# Patient Record
Sex: MALE | Race: WHITE | NOT HISPANIC OR LATINO | Employment: OTHER | URBAN - NONMETROPOLITAN AREA
[De-identification: names, ages, dates, MRNs, and addresses within clinical notes are randomized per-mention and may not be internally consistent; named-entity substitution may affect disease eponyms.]

---

## 2017-02-14 ENCOUNTER — OFFICE VISIT (OUTPATIENT)
Dept: CARDIOLOGY | Facility: CLINIC | Age: 52
End: 2017-02-14

## 2017-02-14 VITALS
SYSTOLIC BLOOD PRESSURE: 144 MMHG | WEIGHT: 210.6 LBS | HEART RATE: 108 BPM | HEIGHT: 68 IN | DIASTOLIC BLOOD PRESSURE: 80 MMHG | OXYGEN SATURATION: 98 % | BODY MASS INDEX: 31.92 KG/M2

## 2017-02-14 DIAGNOSIS — E78.2 MIXED HYPERLIPIDEMIA: ICD-10-CM

## 2017-02-14 DIAGNOSIS — I25.10 CORONARY ARTERY DISEASE INVOLVING NATIVE CORONARY ARTERY OF NATIVE HEART WITHOUT ANGINA PECTORIS: Primary | ICD-10-CM

## 2017-02-14 DIAGNOSIS — I10 ESSENTIAL HYPERTENSION: ICD-10-CM

## 2017-02-14 PROCEDURE — 99213 OFFICE O/P EST LOW 20 MIN: CPT | Performed by: INTERNAL MEDICINE

## 2017-02-14 PROCEDURE — 93000 ELECTROCARDIOGRAM COMPLETE: CPT | Performed by: INTERNAL MEDICINE

## 2017-02-14 RX ORDER — METOPROLOL TARTRATE 75 MG/1
1 TABLET, FILM COATED ORAL 2 TIMES DAILY
Qty: 180 TABLET | Refills: 0 | Status: SHIPPED | OUTPATIENT
Start: 2017-02-14 | End: 2017-05-10 | Stop reason: SDUPTHER

## 2017-02-14 RX ORDER — SIMVASTATIN 10 MG
20 TABLET ORAL NIGHTLY
COMMUNITY
End: 2017-05-02 | Stop reason: CLARIF

## 2017-02-14 NOTE — PROGRESS NOTES
subjective     Chief Complaint   Patient presents with   • Chest Pain   • Coronary Artery Disease   • Hypertension     History of Present Illness  Patient is a 51 years old white male who has known coronary artery disease status post CABG followed by multiple stents, angioplasty and stenting to the vein graft to RCA by Dr. Landers in 2014.    Patient was recently admitted to the hospital on October 24, 2016 and discharged on October 26, 2016.  He had a stress test done by Dr. Munson was found to have small sized mild degree of ischemia and inferior wall.  Patient states that he was taken to the cardiac catheter lab for possible coronary angiography however it was decided against it and patient was sent home with the addition of Imdur 30 mg daily and Ranexa 500 twice a day.  Patient is not having any chest pain anymore is feeling better.  But his heartbeat is fast.    HYPERTENSION  Markos Salinas has long-standing essential hypertension for years. he is taking medications regularly. There are no medication side effects. Blood pressure is very well controlled. There has been no headache nausea chest pain. There has been no syncopal or presyncopal episode. he denies episodes of hypo-tension or accelerated hypertension.    Hyperlipidemia  Markos Salinas has long-standing history of hyperlipidemia. Has been trying to lose weight following diet and exercise. Patient is tolerating medications very well. There has been no side effects. Latest lipid levels have been good. Patient has not had any lab work in my office but states that his lipids have been very well controlled and he has been checking with his PCP.     Patient also is diabetic type 2 insulin-dependent.    Patient Active Problem List   Diagnosis   • Vitamin B12 deficiency   • Vitamin D deficiency   • Hyperlipidemia   • Diabetes mellitus   • Hypertension   • CAD status post CABG, 5 stents in [place], angioplasty and stenting to vein graft to RCA Dr. Landers  2014   • Angina at rest   • Insulin dependent type 2 diabetes mellitus, uncontrolled   • Chronic low back pain   • Diabetic neuropathy   • Essential hypertension   • GERD (gastroesophageal reflux disease)   • Restless leg syndrome   • Osteoarthritis   • Anemia       Social History   Substance Use Topics   • Smoking status: Former Smoker     Packs/day: 1.00     Years: 30.00     Types: Cigarettes     Quit date: 2013   • Smokeless tobacco: Former User     Types: Snuff      Comment: 1 can per 3 days   • Alcohol use No       Allergies   Allergen Reactions   • Benadryl [Diphenhydramine]    • Erythromycin    • Latex    • Penicillins    • Sulfa Antibiotics    • Tdap [Diphth-Acell Pertussis-Tetanus]    • Tetracyclines & Related          Current Outpatient Prescriptions:   •  allopurinol (ZYLOPRIM) 100 MG tablet, Take 100 mg by mouth Every Morning., Disp: , Rfl:   •  ALPRAZolam (XANAX) 0.5 MG tablet, Take 0.5 mg by mouth 3 (Three) Times a Day As Needed for anxiety., Disp: , Rfl:   •  aspirin 325 MG tablet, Take 325 mg by mouth daily., Disp: , Rfl:   •  atorvastatin (LIPITOR) 80 MG tablet, Take 1 tablet by mouth Every Night., Disp: 30 tablet, Rfl: 0  •  citalopram (CeleXA) 10 MG tablet, Take 10 mg by mouth daily., Disp: , Rfl:   •  clopidogrel (PLAVIX) 75 MG tablet, Take 75 mg by mouth daily., Disp: , Rfl:   •  cyclobenzaprine (FLEXERIL) 10 MG tablet, Take 10 mg by mouth 3 (three) times a day as needed for muscle spasms., Disp: , Rfl:   •  ezetimibe (ZETIA) 10 MG tablet, Take 10 mg by mouth Every Night., Disp: , Rfl:   •  fenofibrate (TRICOR) 48 MG tablet, Take 1 tablet by mouth Daily., Disp: 30 tablet, Rfl: 0  •  ferrous sulfate 325 (65 FE) MG tablet, Take 1 tablet by mouth 2 (Two) Times a Day With Meals., Disp: 60 tablet, Rfl: 0  •  gabapentin (NEURONTIN) 300 MG capsule, Take 600 mg by mouth Every Night., Disp: , Rfl:   •  glimepiride (AMARYL) 2 MG tablet, Take 2 mg by mouth daily., Disp: , Rfl:   •   HYDROcodone-acetaminophen (NORCO)  MG per tablet, Take 1 tablet by mouth 4 (Four) Times a Day As Needed for moderate pain (4-6)., Disp: , Rfl:   •  Insulin Glargine (TOUJEO SOLOSTAR) 300 UNIT/ML solution pen-injector, Inject 50 Units under the skin 2 (two) times a day., Disp: , Rfl:   •  isosorbide mononitrate (IMDUR) 60 MG 24 hr tablet, Take 1 tablet by mouth Daily., Disp: 30 tablet, Rfl: 0  •  metFORMIN (GLUCOPHAGE) 1000 MG tablet, Take 1,000 mg by mouth 2 (two) times a day., Disp: , Rfl:   •  nitroglycerin (NITROSTAT) 0.4 MG SL tablet, Place 0.4 mg under the tongue every 5 (five) minutes as needed for chest pain. Take no more than 3 doses in 15 minutes., Disp: , Rfl:   •  Omega-3 Fatty Acids (FISH OIL) 1000 MG capsule capsule, Take 1,000 mg by mouth Every Night., Disp: , Rfl:   •  pantoprazole (PROTONIX) 40 MG EC tablet, Take 40 mg by mouth Every Night., Disp: , Rfl:   •  pramipexole (MIRAPEX) 0.5 MG tablet, Take 0.5 mg by mouth 3 (three) times a day., Disp: , Rfl:   •  ranolazine (RANEXA) 500 MG 12 hr tablet, Take 1 tablet by mouth Every 12 (Twelve) Hours., Disp: 60 tablet, Rfl: 0  •  rOPINIRole (REQUIP) 1 MG tablet, Take 1 mg by mouth every night. Take 1 hour before bedtime., Disp: , Rfl:   •  simvastatin (ZOCOR) 10 MG tablet, Take 10 mg by mouth Every Night., Disp: , Rfl:   •  vitamin D (ERGOCALCIFEROL) 77822 UNITS capsule capsule, Take 50,000 Units by mouth 1 (One) Time Per Week. Takes on mondays, Disp: , Rfl:   •  zolpidem (AMBIEN) 10 MG tablet, Take 10 mg by mouth Every Night., Disp: , Rfl:   •  Metoprolol Tartrate 75 MG tablet, Take 1 tablet by mouth 2 (Two) Times a Day., Disp: 180 tablet, Rfl: 0      The following portions of the patient's history were reviewed and updated as appropriate: allergies, current medications, past family history, past medical history, past social history, past surgical history and problem list.    Review of Systems   Constitution: Negative.   HENT: Negative.    Eyes:  "Negative.    Cardiovascular: Positive for palpitations. Negative for chest pain, dyspnea on exertion, leg swelling, near-syncope, orthopnea, paroxysmal nocturnal dyspnea and syncope.   Respiratory: Negative.  Negative for shortness of breath.    Hematologic/Lymphatic: Negative.    Musculoskeletal: Negative.    Gastrointestinal: Negative.    Neurological: Negative.           Objective:     Visit Vitals   • /80 (BP Location: Left arm, Patient Position: Sitting)   • Pulse 108   • Ht 68\" (172.7 cm)   • Wt 210 lb 9.6 oz (95.5 kg)   • SpO2 98%   • BMI 32.02 kg/m2     Physical Exam   Constitutional: He appears well-developed and well-nourished.   HENT:   Head: Normocephalic and atraumatic.   Mouth/Throat: Oropharynx is clear and moist.   Eyes: Conjunctivae and EOM are normal. Pupils are equal, round, and reactive to light. No scleral icterus.   Neck: Normal range of motion. Neck supple. No JVD present. No tracheal deviation present. No thyromegaly present.   Cardiovascular: Regular rhythm, normal heart sounds and intact distal pulses.  Tachycardia present.  Exam reveals no friction rub.    No murmur heard.  Pulmonary/Chest: Effort normal and breath sounds normal. No respiratory distress. He has no wheezes. He has no rales. He exhibits no tenderness.   Abdominal: Soft. Bowel sounds are normal. He exhibits no distension and no mass. There is no tenderness. There is no rebound and no guarding.   Musculoskeletal: Normal range of motion. He exhibits no edema, tenderness or deformity.   Lymphadenopathy:     He has no cervical adenopathy.   Neurological: He is alert. He has normal reflexes. No cranial nerve deficit. He exhibits normal muscle tone. Coordination normal.   Skin: Skin is warm and dry.   Psychiatric: He has a normal mood and affect. His behavior is normal. Judgment and thought content normal.         Lab Review  Lab Results   Component Value Date     (L) 10/26/2016    K 4.1 10/26/2016     10/26/2016 "    BUN 18 10/26/2016    CREATININE 1.18 10/26/2016    GLUCOSE 298 (H) 10/26/2016    CALCIUM 9.5 10/26/2016    ALT 34 10/26/2016    ALKPHOS 93 10/26/2016    LABIL2 1.5 10/26/2016     Lab Results   Component Value Date    CKTOTAL 79 10/25/2016     Lab Results   Component Value Date    WBC 7.28 10/26/2016    HGB 10.9 (L) 10/26/2016    HCT 35.8 (L) 10/26/2016     10/26/2016     Lab Results   Component Value Date    INR 0.95 10/24/2016    INR 0.94 12/06/2014    INR 0.93 12/05/2014     Lab Results   Component Value Date    MG 1.9 10/24/2016     Lab Results   Component Value Date    TSH 2.639 10/24/2016     No results found for: BNP  Lab Results   Component Value Date    CHLPL 117 12/06/2014     Lab Results   Component Value Date    CHOL 122 10/25/2016    TRIG 641 (H) 10/25/2016    HDL 20 (L) 10/25/2016    LDLCALC  10/25/2016      Comment:      Unable to calculate    VLDL  10/25/2016      Comment:      Unable to calculate    LDLHDL  10/25/2016      Comment:      Unable to calculate           ECG 12 Lead  Date/Time: 2/15/2017 11:15 AM  Performed by: MICHAEL BLANDON  Authorized by: MICHAEL BLANDON   Rhythm: sinus rhythm and sinus tachycardia  Rate: normal  QRS axis: normal  Other: no other findings  Clinical impression: abnormal ECG  Comments: Nonspecific ST and T wave changes             I personally viewed and interpreted the patient's LAB data         Assessment:     1. Coronary artery disease involving native coronary artery of native heart without angina pectoris    2. Mixed hyperlipidemia    3. Essential hypertension          Plan:      Patient has resting tachycardia of 100/m.  He is taking metoprolol 50 twice a day.  He was advised to increase it to 75 twice a day.    His chest discomfort is significantly improved with a Ranexa 500 twice a day and Imdur 30 mg daily.  He is currently asymptomatic except for fast heartbeat.  Lengthy discussion with the patient regarding further options.  The first  control the heart rate may need to go 200 twice a day then may need to increase his Ranexa dose.    Blood pressure is mildly elevated.  Patient will check his blood pressure regularly.  Hopefully with the increased dose of metoprolol blood pressure is less than 140 other was he will let me know for further adjustment of medications    Aggressive risk factor modification was stressed and explained to the patient.  Refills of medications were given  Follow-up scheduled        Return in about 3 months (around 5/14/2017).

## 2017-05-02 ENCOUNTER — OFFICE VISIT (OUTPATIENT)
Dept: CARDIOLOGY | Facility: CLINIC | Age: 52
End: 2017-05-02

## 2017-05-02 VITALS
OXYGEN SATURATION: 95 % | BODY MASS INDEX: 32.4 KG/M2 | HEIGHT: 68 IN | SYSTOLIC BLOOD PRESSURE: 132 MMHG | HEART RATE: 79 BPM | WEIGHT: 213.8 LBS | DIASTOLIC BLOOD PRESSURE: 76 MMHG

## 2017-05-02 DIAGNOSIS — I10 ESSENTIAL HYPERTENSION: ICD-10-CM

## 2017-05-02 DIAGNOSIS — I25.10 CORONARY ARTERY DISEASE INVOLVING NATIVE CORONARY ARTERY OF NATIVE HEART WITHOUT ANGINA PECTORIS: Primary | ICD-10-CM

## 2017-05-02 DIAGNOSIS — E78.2 MIXED HYPERLIPIDEMIA: ICD-10-CM

## 2017-05-02 PROCEDURE — 99213 OFFICE O/P EST LOW 20 MIN: CPT | Performed by: INTERNAL MEDICINE

## 2017-05-02 PROCEDURE — 93000 ELECTROCARDIOGRAM COMPLETE: CPT | Performed by: INTERNAL MEDICINE

## 2017-05-02 RX ORDER — GEMFIBROZIL 600 MG/1
600 TABLET, FILM COATED ORAL
COMMUNITY
End: 2023-01-25

## 2017-05-02 RX ORDER — MELOXICAM 15 MG/1
15 TABLET ORAL DAILY
COMMUNITY
End: 2023-01-25

## 2017-05-02 RX ORDER — SIMVASTATIN 20 MG
20 TABLET ORAL NIGHTLY
COMMUNITY
End: 2023-01-25

## 2017-08-29 ENCOUNTER — OFFICE VISIT (OUTPATIENT)
Dept: CARDIOLOGY | Facility: CLINIC | Age: 52
End: 2017-08-29

## 2017-08-29 VITALS
HEIGHT: 68 IN | HEART RATE: 95 BPM | SYSTOLIC BLOOD PRESSURE: 112 MMHG | BODY MASS INDEX: 30.68 KG/M2 | DIASTOLIC BLOOD PRESSURE: 78 MMHG | WEIGHT: 202.4 LBS | OXYGEN SATURATION: 98 %

## 2017-08-29 DIAGNOSIS — I10 ESSENTIAL HYPERTENSION: ICD-10-CM

## 2017-08-29 DIAGNOSIS — E78.2 MIXED HYPERLIPIDEMIA: ICD-10-CM

## 2017-08-29 DIAGNOSIS — I25.10 CORONARY ARTERY DISEASE INVOLVING NATIVE CORONARY ARTERY OF NATIVE HEART WITHOUT ANGINA PECTORIS: Primary | ICD-10-CM

## 2017-08-29 PROCEDURE — 99214 OFFICE O/P EST MOD 30 MIN: CPT | Performed by: INTERNAL MEDICINE

## 2019-04-19 ENCOUNTER — HOSPITAL ENCOUNTER (EMERGENCY)
Facility: HOSPITAL | Age: 54
Discharge: HOME | End: 2019-04-19
Attending: EMERGENCY MEDICINE | Admitting: EMERGENCY MEDICINE

## 2019-04-19 ENCOUNTER — APPOINTMENT (OUTPATIENT)
Dept: CT IMAGING | Facility: HOSPITAL | Age: 54
End: 2019-04-19

## 2019-04-19 VITALS
OXYGEN SATURATION: 99 % | TEMPERATURE: 98 F | WEIGHT: 200 LBS | DIASTOLIC BLOOD PRESSURE: 77 MMHG | HEIGHT: 68 IN | RESPIRATION RATE: 18 BRPM | BODY MASS INDEX: 30.31 KG/M2 | HEART RATE: 77 BPM | SYSTOLIC BLOOD PRESSURE: 138 MMHG

## 2019-04-19 DIAGNOSIS — R10.9 ABDOMINAL PAIN, UNSPECIFIED ABDOMINAL LOCATION: Primary | ICD-10-CM

## 2019-04-19 LAB
ALBUMIN SERPL-MCNC: 3.42 G/DL (ref 3.5–5.2)
ALBUMIN/GLOB SERPL: 1.4 G/DL
ALP SERPL-CCNC: 56 U/L (ref 39–117)
ALT SERPL W P-5'-P-CCNC: 22 U/L (ref 1–41)
ANION GAP SERPL CALCULATED.3IONS-SCNC: 12.7 MMOL/L
APTT PPP: 29.9 SECONDS (ref 23.8–36.1)
AST SERPL-CCNC: 21 U/L (ref 1–40)
BASOPHILS # BLD AUTO: 0.02 10*3/MM3 (ref 0–0.2)
BASOPHILS NFR BLD AUTO: 0.3 % (ref 0–1.5)
BILIRUB SERPL-MCNC: 0.2 MG/DL (ref 0.2–1.2)
BILIRUB UR QL STRIP: NEGATIVE
BUN BLD-MCNC: 10 MG/DL (ref 6–20)
BUN/CREAT SERPL: 10.1 (ref 7–25)
CALCIUM SPEC-SCNC: 8.6 MG/DL (ref 8.6–10.5)
CHLORIDE SERPL-SCNC: 105 MMOL/L (ref 98–107)
CLARITY UR: CLEAR
CO2 SERPL-SCNC: 24.3 MMOL/L (ref 22–29)
COLOR UR: YELLOW
CREAT BLD-MCNC: 0.99 MG/DL (ref 0.76–1.27)
DEPRECATED RDW RBC AUTO: 44.7 FL (ref 37–54)
EOSINOPHIL # BLD AUTO: 0.2 10*3/MM3 (ref 0–0.4)
EOSINOPHIL NFR BLD AUTO: 3.2 % (ref 0.3–6.2)
ERYTHROCYTE [DISTWIDTH] IN BLOOD BY AUTOMATED COUNT: 13.9 % (ref 12.3–15.4)
GFR SERPL CREATININE-BSD FRML MDRD: 78 ML/MIN/1.73
GLOBULIN UR ELPH-MCNC: 2.4 GM/DL
GLUCOSE BLD-MCNC: 256 MG/DL (ref 65–99)
GLUCOSE UR STRIP-MCNC: ABNORMAL MG/DL
HCT VFR BLD AUTO: 36.1 % (ref 37.5–51)
HGB BLD-MCNC: 11.7 G/DL (ref 13–17.7)
HGB UR QL STRIP.AUTO: NEGATIVE
IMM GRANULOCYTES # BLD AUTO: 0.02 10*3/MM3 (ref 0–0.05)
IMM GRANULOCYTES NFR BLD AUTO: 0.3 % (ref 0–0.5)
INR PPP: 1.01 (ref 0.9–1.1)
KETONES UR QL STRIP: NEGATIVE
LEUKOCYTE ESTERASE UR QL STRIP.AUTO: NEGATIVE
LIPASE SERPL-CCNC: 50 U/L (ref 13–60)
LYMPHOCYTES # BLD AUTO: 2.45 10*3/MM3 (ref 0.7–3.1)
LYMPHOCYTES NFR BLD AUTO: 39.6 % (ref 19.6–45.3)
MCH RBC QN AUTO: 28.9 PG (ref 26.6–33)
MCHC RBC AUTO-ENTMCNC: 32.4 G/DL (ref 31.5–35.7)
MCV RBC AUTO: 89.1 FL (ref 79–97)
MONOCYTES # BLD AUTO: 0.31 10*3/MM3 (ref 0.1–0.9)
MONOCYTES NFR BLD AUTO: 5 % (ref 5–12)
NEUTROPHILS # BLD AUTO: 3.19 10*3/MM3 (ref 1.4–7)
NEUTROPHILS NFR BLD AUTO: 51.6 % (ref 42.7–76)
NITRITE UR QL STRIP: NEGATIVE
PH UR STRIP.AUTO: 5.5 [PH] (ref 5–8)
PLATELET # BLD AUTO: 224 10*3/MM3 (ref 140–450)
PMV BLD AUTO: 10.4 FL (ref 6–12)
POTASSIUM BLD-SCNC: 4.2 MMOL/L (ref 3.5–5.2)
PROT SERPL-MCNC: 5.8 G/DL (ref 6–8.5)
PROT UR QL STRIP: NEGATIVE
PROTHROMBIN TIME: 13.5 SECONDS (ref 11–15.4)
RBC # BLD AUTO: 4.05 10*6/MM3 (ref 4.14–5.8)
SODIUM BLD-SCNC: 142 MMOL/L (ref 136–145)
SP GR UR STRIP: 1.01 (ref 1–1.03)
UROBILINOGEN UR QL STRIP: ABNORMAL
WBC NRBC COR # BLD: 6.19 10*3/MM3 (ref 3.4–10.8)

## 2019-04-19 PROCEDURE — 80053 COMPREHEN METABOLIC PANEL: CPT | Performed by: EMERGENCY MEDICINE

## 2019-04-19 PROCEDURE — 25010000002 ONDANSETRON PER 1 MG: Performed by: EMERGENCY MEDICINE

## 2019-04-19 PROCEDURE — 81003 URINALYSIS AUTO W/O SCOPE: CPT | Performed by: EMERGENCY MEDICINE

## 2019-04-19 PROCEDURE — 25010000002 MORPHINE PER 10 MG: Performed by: EMERGENCY MEDICINE

## 2019-04-19 PROCEDURE — 74178 CT ABD&PLV WO CNTR FLWD CNTR: CPT | Performed by: RADIOLOGY

## 2019-04-19 PROCEDURE — 96376 TX/PRO/DX INJ SAME DRUG ADON: CPT

## 2019-04-19 PROCEDURE — 83690 ASSAY OF LIPASE: CPT | Performed by: EMERGENCY MEDICINE

## 2019-04-19 PROCEDURE — 25010000002 IOPAMIDOL 61 % SOLUTION: Performed by: EMERGENCY MEDICINE

## 2019-04-19 PROCEDURE — 96361 HYDRATE IV INFUSION ADD-ON: CPT

## 2019-04-19 PROCEDURE — 96374 THER/PROPH/DIAG INJ IV PUSH: CPT

## 2019-04-19 PROCEDURE — 96375 TX/PRO/DX INJ NEW DRUG ADDON: CPT

## 2019-04-19 PROCEDURE — 85025 COMPLETE CBC W/AUTO DIFF WBC: CPT | Performed by: EMERGENCY MEDICINE

## 2019-04-19 PROCEDURE — 85730 THROMBOPLASTIN TIME PARTIAL: CPT | Performed by: EMERGENCY MEDICINE

## 2019-04-19 PROCEDURE — 85610 PROTHROMBIN TIME: CPT | Performed by: EMERGENCY MEDICINE

## 2019-04-19 PROCEDURE — 99283 EMERGENCY DEPT VISIT LOW MDM: CPT

## 2019-04-19 PROCEDURE — 74178 CT ABD&PLV WO CNTR FLWD CNTR: CPT

## 2019-04-19 RX ORDER — SODIUM CHLORIDE 0.9 % (FLUSH) 0.9 %
10 SYRINGE (ML) INJECTION AS NEEDED
Status: DISCONTINUED | OUTPATIENT
Start: 2019-04-19 | End: 2019-04-19 | Stop reason: HOSPADM

## 2019-04-19 RX ORDER — DICYCLOMINE HCL 20 MG
20 TABLET ORAL EVERY 6 HOURS PRN
Qty: 20 TABLET | Refills: 0 | Status: SHIPPED | OUTPATIENT
Start: 2019-04-19 | End: 2023-01-25

## 2019-04-19 RX ORDER — ONDANSETRON 2 MG/ML
4 INJECTION INTRAMUSCULAR; INTRAVENOUS ONCE
Status: COMPLETED | OUTPATIENT
Start: 2019-04-19 | End: 2019-04-19

## 2019-04-19 RX ORDER — SODIUM CHLORIDE 9 MG/ML
125 INJECTION, SOLUTION INTRAVENOUS CONTINUOUS
Status: DISCONTINUED | OUTPATIENT
Start: 2019-04-19 | End: 2019-04-19 | Stop reason: HOSPADM

## 2019-04-19 RX ORDER — ONDANSETRON 4 MG/1
4 TABLET, ORALLY DISINTEGRATING ORAL 4 TIMES DAILY
Qty: 15 TABLET | Refills: 0 | Status: SHIPPED | OUTPATIENT
Start: 2019-04-19 | End: 2023-03-23

## 2019-04-19 RX ORDER — DOCUSATE CALCIUM 240 MG
240 CAPSULE ORAL DAILY
Qty: 30 CAPSULE | Refills: 0 | Status: SHIPPED | OUTPATIENT
Start: 2019-04-19 | End: 2023-01-25

## 2019-04-19 RX ADMIN — SODIUM CHLORIDE 1000 ML: 9 INJECTION, SOLUTION INTRAVENOUS at 02:16

## 2019-04-19 RX ADMIN — IOPAMIDOL 90 ML: 612 INJECTION, SOLUTION INTRAVENOUS at 03:23

## 2019-04-19 RX ADMIN — MORPHINE SULFATE 4 MG: 4 INJECTION, SOLUTION INTRAMUSCULAR; INTRAVENOUS at 03:46

## 2019-04-19 RX ADMIN — MORPHINE SULFATE 4 MG: 4 INJECTION INTRAVENOUS at 02:13

## 2019-04-19 RX ADMIN — SODIUM CHLORIDE 125 ML/HR: 9 INJECTION, SOLUTION INTRAVENOUS at 02:16

## 2019-04-19 RX ADMIN — ONDANSETRON 4 MG: 2 INJECTION, SOLUTION INTRAMUSCULAR; INTRAVENOUS at 02:11

## 2019-04-20 ENCOUNTER — TELEPHONE (OUTPATIENT)
Dept: EMERGENCY DEPT | Facility: HOSPITAL | Age: 54
End: 2019-04-20

## 2021-01-14 ENCOUNTER — TRANSCRIBE ORDERS (OUTPATIENT)
Dept: ADMINISTRATIVE | Facility: HOSPITAL | Age: 56
End: 2021-01-14

## 2021-01-14 DIAGNOSIS — Z01.818 OTHER SPECIFIED PRE-OPERATIVE EXAMINATION: Primary | ICD-10-CM

## 2021-01-20 ENCOUNTER — LAB (OUTPATIENT)
Dept: LAB | Facility: HOSPITAL | Age: 56
End: 2021-01-20

## 2021-01-20 DIAGNOSIS — Z01.818 OTHER SPECIFIED PRE-OPERATIVE EXAMINATION: ICD-10-CM

## 2021-01-20 PROCEDURE — C9803 HOPD COVID-19 SPEC COLLECT: HCPCS

## 2021-01-20 PROCEDURE — U0004 COV-19 TEST NON-CDC HGH THRU: HCPCS | Performed by: OPHTHALMOLOGY

## 2021-01-21 LAB — SARS-COV-2 RNA RESP QL NAA+PROBE: NOT DETECTED

## 2021-02-23 ENCOUNTER — TRANSCRIBE ORDERS (OUTPATIENT)
Dept: ADMINISTRATIVE | Facility: HOSPITAL | Age: 56
End: 2021-02-23

## 2021-02-23 DIAGNOSIS — Z01.818 PRE-OPERATIVE CLEARANCE: Primary | ICD-10-CM

## 2021-02-24 ENCOUNTER — LAB (OUTPATIENT)
Dept: LAB | Facility: HOSPITAL | Age: 56
End: 2021-02-24

## 2021-02-24 DIAGNOSIS — Z01.818 PRE-OPERATIVE CLEARANCE: ICD-10-CM

## 2021-02-24 PROCEDURE — U0004 COV-19 TEST NON-CDC HGH THRU: HCPCS

## 2021-02-24 PROCEDURE — C9803 HOPD COVID-19 SPEC COLLECT: HCPCS

## 2021-02-25 LAB — SARS-COV-2 RNA RESP QL NAA+PROBE: NOT DETECTED

## 2021-04-26 NOTE — PROGRESS NOTES
Problem: Physical Regulation:  Goal: Complications related to the disease process, condition or treatment will be avoided or minimized  Description: Complications related to the disease process, condition or treatment will be avoided or minimized  4/26/2021 1006 by Coy Salas RN  Outcome: Met This Shift subjective     Chief Complaint   Patient presents with   • Coronary Artery Disease   • Hyperlipidemia   • Hypertension     History of Present Illness  Patient is here for follow-up of multiple cardiac issues.  Patient has known coronary artery disease requiring coronary artery bypass graft surgery followed by angioplasty and stenting to the vein graft in 2014.  Patient recently was started on Imdur and Ranexa is feeling much better.  He said that he has no further chest discomfort and feeling very good.    Patient has hyperlipidemia and is taking multiple medications including Zetia 10 mg daily, Zocor 20 mg daily, Lopid 600 twice a day, TriCor 48 daily and fish oil.  Lab work were reviewed and discussed.  Cholesterol is normal but triglyceride is significantly elevated probably due to very poorly controlled diabetes.  Patient was advised to DC TriCor.    Blood pressure is very well controlled.  Sugar is running around 290.    Past Surgical History:   Procedure Laterality Date   • BACK SURGERY     • CARDIAC CATHETERIZATION      5 stents placed at [facility] per patient   • CARDIAC CATHETERIZATION  10/26/2016    Procedure: Case Abort;  Surgeon: Norberto Lopez MD;  Location: [facility] CATH INVASIVE LOCATION;  Service:    • CARDIOVASCULAR STRESS TEST  11/2014   • CHOLECYSTECTOMY     • CORONARY ARTERY BYPASS GRAFT     • CORONARY STENT PLACEMENT      Stent to vein graft to RCA   • ECHO - CONVERTED  12/2014   • UMBILICAL HERNIA REPAIR      x2   • VASECTOMY       Family History   Problem Relation Age of Onset   • Lung cancer Mother    • Diabetes Mother    • Cancer Mother    • Heart attack Father    • Diabetes Sister    • No Known Problems Brother    • Heart attack Paternal Grandfather      Past Medical History:   Diagnosis Date   • Allergic rhinitis    • Anxiety    • Chronic low back pain    • Coronary artery disease    • Diabetic neuropathy    • Essential hypertension    • GERD (gastroesophageal reflux disease)    •  Gout    • History of Bell's palsy    • Hx of hyperkalemia    • Hyperlipidemia    • Insomnia    • Insulin dependent type 2 diabetes mellitus, uncontrolled    • Multiple pulmonary nodules    • Myocardial infarction    • AVA (obstructive sleep apnea)    • Osteoarthritis    • Restless leg syndrome    • Vitamin B12 deficiency    • Vitamin D deficiency      Patient Active Problem List   Diagnosis   • Vitamin B12 deficiency   • Vitamin D deficiency   • Hyperlipidemia   • CAD status post CABG, 5 stents in [place], angioplasty and stenting to vein graft to RCA Dr. Landers 2014   • Insulin dependent type 2 diabetes mellitus, uncontrolled   • Chronic low back pain   • Diabetic neuropathy   • Essential hypertension   • GERD (gastroesophageal reflux disease)   • Restless leg syndrome   • Osteoarthritis   • Anemia       Social History   Substance Use Topics   • Smoking status: Former Smoker     Packs/day: 1.00     Years: 30.00     Types: Cigarettes     Quit date: 2013   • Smokeless tobacco: Former User     Types: Snuff      Comment: 1 can per 3 days   • Alcohol use No       Allergies   Allergen Reactions   • Benadryl [Diphenhydramine]    • Erythromycin    • Latex    • Penicillins    • Sulfa Antibiotics    • Tdap [Diphth-Acell Pertussis-Tetanus]    • Tetracyclines & Related        Current Outpatient Prescriptions on File Prior to Visit   Medication Sig   • allopurinol (ZYLOPRIM) 100 MG tablet Take 100 mg by mouth Every Morning.   • ALPRAZolam (XANAX) 0.5 MG tablet Take 0.5 mg by mouth 3 (Three) Times a Day As Needed for anxiety.   • aspirin 325 MG tablet Take 325 mg by mouth daily.   • citalopram (CeleXA) 10 MG tablet Take 10 mg by mouth daily.   • clopidogrel (PLAVIX) 75 MG tablet Take 75 mg by mouth daily.   • cyclobenzaprine (FLEXERIL) 10 MG tablet Take 10 mg by mouth 3 (three) times a day as needed for muscle spasms.   • ezetimibe (ZETIA) 10 MG tablet Take 10 mg by mouth Every Night.   • ferrous sulfate 325 (65 FE) MG tablet  Take 1 tablet by mouth 2 (Two) Times a Day With Meals.   • gabapentin (NEURONTIN) 300 MG capsule Take 600 mg by mouth Every Night.   • gemfibrozil (LOPID) 600 MG tablet Take 600 mg by mouth 2 (Two) Times a Day Before Meals.   • glimepiride (AMARYL) 2 MG tablet Take 2 mg by mouth daily.   • HYDROcodone-acetaminophen (NORCO)  MG per tablet Take 1 tablet by mouth 4 (Four) Times a Day As Needed for moderate pain (4-6).   • Insulin Glargine (TOUJEO SOLOSTAR) 300 UNIT/ML solution pen-injector Inject 50 Units under the skin 2 (two) times a day.   • isosorbide mononitrate (IMDUR) 60 MG 24 hr tablet Take 1 tablet by mouth Daily.   • meloxicam (MOBIC) 15 MG tablet Take 15 mg by mouth Daily.   • metFORMIN (GLUCOPHAGE) 1000 MG tablet Take 1,000 mg by mouth 2 (two) times a day.   • metoprolol tartrate (LOPRESSOR) 25 MG tablet TAKE 3 TABLETS BY MOUTH TWICE DAILY   • nitroglycerin (NITROSTAT) 0.4 MG SL tablet Place 0.4 mg under the tongue every 5 (five) minutes as needed for chest pain. Take no more than 3 doses in 15 minutes.   • Omega-3 Fatty Acids (FISH OIL) 1000 MG capsule capsule Take 1,000 mg by mouth Every Night.   • pantoprazole (PROTONIX) 40 MG EC tablet Take 40 mg by mouth Every Night.   • pramipexole (MIRAPEX) 0.5 MG tablet Take 0.5 mg by mouth 3 (three) times a day.   • ranolazine (RANEXA) 500 MG 12 hr tablet Take 1 tablet by mouth Every 12 (Twelve) Hours.   • rOPINIRole (REQUIP) 1 MG tablet Take 1 mg by mouth every night. Take 1 hour before bedtime.   • simvastatin (ZOCOR) 20 MG tablet Take 20 mg by mouth Every Night.   • vitamin D (ERGOCALCIFEROL) 44291 UNITS capsule capsule Take 50,000 Units by mouth 1 (One) Time Per Week. Takes on mondays   • zolpidem (AMBIEN) 10 MG tablet Take 10 mg by mouth Every Night.   • [DISCONTINUED] fenofibrate (TRICOR) 48 MG tablet Take 1 tablet by mouth Daily.     No current facility-administered medications on file prior to visit.          The following portions of the patient's  "history were reviewed and updated as appropriate: allergies, current medications, past family history, past medical history, past social history, past surgical history and problem list.    Review of Systems   Constitution: Negative.   HENT: Negative.  Negative for congestion and headaches.    Eyes: Negative.    Cardiovascular: Negative.  Negative for chest pain, cyanosis, dyspnea on exertion, irregular heartbeat, leg swelling, near-syncope, orthopnea, palpitations, paroxysmal nocturnal dyspnea and syncope.   Respiratory: Negative.  Negative for shortness of breath.    Hematologic/Lymphatic: Negative.    Musculoskeletal: Positive for arthritis.   Gastrointestinal: Positive for heartburn.   Neurological: Negative.    Psychiatric/Behavioral: The patient has insomnia and is nervous/anxious.           Objective:     /78  Pulse 95  Ht 68\" (172.7 cm)  Wt 202 lb 6.4 oz (91.8 kg)  SpO2 98%  BMI 30.77 kg/m2  Physical Exam   Constitutional: He appears well-developed and well-nourished. No distress.   HENT:   Head: Normocephalic and atraumatic.   Mouth/Throat: Oropharynx is clear and moist. No oropharyngeal exudate.   Eyes: Conjunctivae and EOM are normal. Pupils are equal, round, and reactive to light. No scleral icterus.   Neck: Normal range of motion. Neck supple. No JVD present. No tracheal deviation present. No thyromegaly present.   Cardiovascular: Normal rate, regular rhythm, normal heart sounds and intact distal pulses.  PMI is not displaced.  Exam reveals no gallop, no friction rub and no decreased pulses.    No murmur heard.  Pulses:       Carotid pulses are 3+ on the right side, and 3+ on the left side.       Radial pulses are 3+ on the right side, and 3+ on the left side.   Pulmonary/Chest: Effort normal and breath sounds normal. No respiratory distress. He has no wheezes. He has no rales. He exhibits no tenderness.   Abdominal: Soft. Bowel sounds are normal. He exhibits no distension, no abdominal bruit " and no mass. There is no splenomegaly or hepatomegaly. There is no tenderness. There is no rebound and no guarding.   Musculoskeletal: Normal range of motion. He exhibits no edema, tenderness or deformity.   Lymphadenopathy:     He has no cervical adenopathy.   Neurological: He is alert. He has normal reflexes. No cranial nerve deficit. He exhibits normal muscle tone. Coordination normal.   Skin: Skin is warm and dry. No rash noted. He is not diaphoretic. No erythema.   Psychiatric: He has a normal mood and affect. His behavior is normal. Judgment and thought content normal.         Lab Review  Data 8/16/2017  Blood sugar 290, triglycerides 344  Cholesterol 107 HDL 22 LDL 16  Creatinine 1.2  CPK 86  Procedures       I personally viewed and interpreted the patient's LAB data         Assessment:     1. Coronary artery disease involving native coronary artery of native heart without angina pectoris    2. Essential hypertension    3. Mixed hyperlipidemia          Plan:      Patient is doing very well from cardiac standpoint he was advised to continue current medications.  He is trying to lose weight and has been successful.  Aggressive risk factor modification again emphasized.  Blood sugar is the biggest problem patient is scheduled to see the diabetic specialist in [place].    Unfortunately patient is taking TriCor and Lopid with Zocor he was advised to DC TriCor.  Even use of Lopid with Zocor could promote rhabdomyolysis however patient has been taking it for quite some time and has had no problem.  Triglyceride is significantly elevated but it is probably due to diabetes.  That better diabetic control is worse needed.    No change in cardiac medications except to DC TriCor.  Exercise program and healthy lifestyle and dietary restrictions were again stressed.  Follow-up scheduled        Return in about 3 months (around 11/29/2017).

## 2023-01-25 ENCOUNTER — OFFICE VISIT (OUTPATIENT)
Dept: ORTHOPEDIC SURGERY | Facility: CLINIC | Age: 58
End: 2023-01-25
Payer: MEDICARE

## 2023-01-25 VITALS
HEIGHT: 68 IN | BODY MASS INDEX: 25.49 KG/M2 | DIASTOLIC BLOOD PRESSURE: 50 MMHG | WEIGHT: 168.2 LBS | SYSTOLIC BLOOD PRESSURE: 102 MMHG

## 2023-01-25 DIAGNOSIS — M16.12 PRIMARY OSTEOARTHRITIS OF LEFT HIP: Primary | ICD-10-CM

## 2023-01-25 PROCEDURE — 99204 OFFICE O/P NEW MOD 45 MIN: CPT | Performed by: ORTHOPAEDIC SURGERY

## 2023-01-25 RX ORDER — TRAZODONE HYDROCHLORIDE 50 MG/1
50 TABLET ORAL NIGHTLY PRN
COMMUNITY
Start: 2022-12-01

## 2023-01-25 RX ORDER — ASPIRIN 81 MG/1
81 TABLET ORAL DAILY
COMMUNITY

## 2023-01-25 RX ORDER — CITALOPRAM 40 MG/1
40 TABLET ORAL DAILY
COMMUNITY
Start: 2023-01-03

## 2023-01-25 RX ORDER — EMPAGLIFLOZIN 25 MG/1
25 TABLET, FILM COATED ORAL DAILY
COMMUNITY
Start: 2022-12-01

## 2023-01-25 RX ORDER — FENOFIBRATE 48 MG/1
48 TABLET, COATED ORAL DAILY
COMMUNITY
Start: 2022-12-14

## 2023-01-25 RX ORDER — FLUTICASONE PROPIONATE 50 MCG
1 SPRAY, SUSPENSION (ML) NASAL DAILY
COMMUNITY
End: 2023-03-23

## 2023-01-25 RX ORDER — LIRAGLUTIDE 6 MG/ML
1.8 INJECTION SUBCUTANEOUS DAILY
COMMUNITY
Start: 2022-12-27

## 2023-01-25 RX ORDER — OXYCODONE AND ACETAMINOPHEN 10; 325 MG/1; MG/1
1 TABLET ORAL EVERY 6 HOURS
COMMUNITY
Start: 2023-01-20

## 2023-01-25 NOTE — PROGRESS NOTES
[facility] [place] [facility] Note    Subjective     Chief Complaint   Patient presents with   • Left Hip - Pain        HPI    Markos Salinas is a 57 y.o. male who presents with new problem of: left hip pain.  Onset: atraumatic and gradual in nature. The issue has been ongoing for 1 year(s). Pain is a 6/10 on the pain scale. Pain is described as aching and burning. Associated symptoms include pain, popping and grinding. The pain is worse with walking, climbing stairs, sleeping, lying on affected side and any movement of the joint; pain medication and/or NSAID and elevating the extremity improve the pain. Previous treatments have included: NSAIDS.  Pain is reached a point where he would like to consider hip replacement surgery.  He is significantly debilitated by the hip.  He does take Plavix, as he had stents about 10 years ago.  He does not have a cardiologist currently.  His wife is able to help out postoperatively.  He has diabetes, his last hemoglobin A1c was 8 according to the patient.  He reports that he has Parkinson's disease, with mild symptoms currently.    I have reviewed the following portions of the patient's history and agree with: History of Present Illness and Review of Systems    Patient Active Problem List   Diagnosis   • Vitamin B12 deficiency   • Vitamin D deficiency   • Hyperlipidemia   • CAD status post CABG, 5 stents in [place], angioplasty and stenting to vein graft to RCA Dr. Landers 2014   • Insulin dependent type 2 diabetes mellitus, uncontrolled   • Chronic low back pain   • Diabetic neuropathy (HCC)   • Essential hypertension   • GERD (gastroesophageal reflux disease)   • Restless leg syndrome   • Osteoarthritis   • Anemia     Past Medical History:   Diagnosis Date   • Allergic rhinitis    • Anxiety    • Chronic low back pain    • Coronary artery disease    • Diabetes (HCC)    • Diabetic neuropathy (HCC)    • Essential hypertension    • GERD (gastroesophageal reflux disease)    •  Gout    • History of Bell's palsy    • Hx of hyperkalemia    • Hyperlipidemia    • Insomnia    • Insulin dependent type 2 diabetes mellitus, uncontrolled    • Multiple pulmonary nodules    • Myocardial infarction (HCC)    • AVA (obstructive sleep apnea)    • Osteoarthritis    • Parkinson's disease (HCC)    • Restless leg syndrome    • Vitamin B12 deficiency    • Vitamin D deficiency       Past Surgical History:   Procedure Laterality Date   • BACK SURGERY     • CARDIAC CATHETERIZATION      5 stents placed at [facility] per patient   • CARDIAC CATHETERIZATION  10/26/2016    Procedure: Case Abort;  Surgeon: Norberto Lopez MD;  Location: [facility] INVASIVE LOCATION;  Service:    • CARDIOVASCULAR STRESS TEST  11/2014   • CHOLECYSTECTOMY     • CORONARY ARTERY BYPASS GRAFT     • CORONARY STENT PLACEMENT      Stent to vein graft to RCA   • ECHO - CONVERTED  12/2014   • UMBILICAL HERNIA REPAIR      x2   • VASECTOMY        Family History   Problem Relation Age of Onset   • Lung cancer Mother    • Diabetes Mother    • Cancer Mother    • Heart attack Father    • Diabetes Sister    • No Known Problems Brother    • Heart attack Paternal Grandfather      Social History     Socioeconomic History   • Marital status:    Tobacco Use   • Smoking status: Former     Packs/day: 1.00     Years: 30.00     Pack years: 30.00     Types: Cigarettes     Quit date: 2013     Years since quitting: 10.0   • Smokeless tobacco: Former     Types: Snuff   • Tobacco comments:     1 can per 3 days   Substance and Sexual Activity   • Alcohol use: No   • Drug use: No   • Sexual activity: Defer      Current Outpatient Medications on File Prior to Visit   Medication Sig Dispense Refill   • allopurinol (ZYLOPRIM) 100 MG tablet Take 100 mg by mouth Every Morning.     • ALPRAZolam (XANAX) 0.5 MG tablet Take 0.5 mg by mouth 3 (Three) Times a Day As Needed for anxiety.     • aspirin 81 MG EC tablet Take 81 mg by mouth Daily.     • citalopram  (CeleXA) 40 MG tablet Take 40 mg by mouth Daily.     • clopidogrel (PLAVIX) 75 MG tablet Take 75 mg by mouth daily.     • Diclofenac Sodium (VOLTAREN) 1 % gel gel APPLY 4GM TO AFFECTED AREA 4 TIMES DAILY. DO NOT APPLY MORE THAN 16 GM DAILY TO ANY ONE AFFECTED JOINT.     • ezetimibe (ZETIA) 10 MG tablet Take 10 mg by mouth Every Night.     • fenofibrate (TRICOR) 48 MG tablet Take 48 mg by mouth Daily.     • fluticasone (FLONASE) 50 MCG/ACT nasal spray 1 spray into the nostril(s) as directed by provider Daily.     • gabapentin (NEURONTIN) 300 MG capsule Take 600 mg by mouth Every Night.     • Jardiance 25 MG tablet tablet Take 25 mg by mouth Daily.     • metFORMIN (GLUCOPHAGE) 1000 MG tablet Take 1,000 mg by mouth 2 (two) times a day.     • nitroglycerin (NITROSTAT) 0.4 MG SL tablet Place 0.4 mg under the tongue every 5 (five) minutes as needed for chest pain. Take no more than 3 doses in 15 minutes.     • Omega-3 Fatty Acids (FISH OIL) 1000 MG capsule capsule Take 1,000 mg by mouth Every Night.     • ondansetron ODT (ZOFRAN-ODT) 4 MG disintegrating tablet Take 1 tablet by mouth 4 (Four) Times a Day. 15 tablet 0   • oxyCODONE-acetaminophen (PERCOCET)  MG per tablet Take 1 tablet by mouth Every 6 (Six) Hours.     • pantoprazole (PROTONIX) 40 MG EC tablet Take 40 mg by mouth Every Night.     • ranolazine (RANEXA) 500 MG 12 hr tablet Take 1 tablet by mouth Every 12 (Twelve) Hours. 60 tablet 0   • rOPINIRole (REQUIP) 1 MG tablet Take 1 mg by mouth every night. Take 1 hour before bedtime.     • traZODone (DESYREL) 50 MG tablet Take 50 mg by mouth At Night As Needed. for sleep     • Victoza 18 MG/3ML solution pen-injector injection INJECT 1.8MG UNDER SKIN DAILY     • [DISCONTINUED] aspirin 325 MG tablet Take 325 mg by mouth daily.     • [DISCONTINUED] citalopram (CeleXA) 10 MG tablet Take 10 mg by mouth daily.     • [DISCONTINUED] cyclobenzaprine (FLEXERIL) 10 MG tablet Take 10 mg by mouth 3 (three) times a day as  needed for muscle spasms.     • [DISCONTINUED] dicyclomine (BENTYL) 20 MG tablet Take 1 tablet by mouth Every 6 (Six) Hours As Needed (abdominal pain). 20 tablet 0   • [DISCONTINUED] docusate calcium (SURFAK) 240 MG capsule Take 1 capsule by mouth Daily. 30 capsule 0   • [DISCONTINUED] ferrous sulfate 325 (65 FE) MG tablet Take 1 tablet by mouth 2 (Two) Times a Day With Meals. 60 tablet 0   • [DISCONTINUED] gemfibrozil (LOPID) 600 MG tablet Take 600 mg by mouth 2 (Two) Times a Day Before Meals.     • [DISCONTINUED] glimepiride (AMARYL) 2 MG tablet Take 2 mg by mouth daily.     • [DISCONTINUED] HYDROcodone-acetaminophen (NORCO)  MG per tablet Take 1 tablet by mouth 4 (Four) Times a Day As Needed for moderate pain (4-6).     • [DISCONTINUED] Insulin Glargine (TOUJEO SOLOSTAR) 300 UNIT/ML solution pen-injector Inject 50 Units under the skin 2 (two) times a day.     • [DISCONTINUED] isosorbide mononitrate (IMDUR) 60 MG 24 hr tablet Take 1 tablet by mouth Daily. 30 tablet 0   • [DISCONTINUED] meloxicam (MOBIC) 15 MG tablet Take 15 mg by mouth Daily.     • [DISCONTINUED] metoprolol tartrate (LOPRESSOR) 25 MG tablet TAKE 3 TABLETS BY MOUTH TWICE DAILY 540 tablet 0   • [DISCONTINUED] pramipexole (MIRAPEX) 0.5 MG tablet Take 0.5 mg by mouth 3 (three) times a day.     • [DISCONTINUED] simvastatin (ZOCOR) 20 MG tablet Take 20 mg by mouth Every Night.     • [DISCONTINUED] vitamin D (ERGOCALCIFEROL) 51869 UNITS capsule capsule Take 50,000 Units by mouth 1 (One) Time Per Week. Takes on mondays     • [DISCONTINUED] zolpidem (AMBIEN) 10 MG tablet Take 10 mg by mouth Every Night.       No current facility-administered medications on file prior to visit.      Allergies   Allergen Reactions   • Benadryl [Diphenhydramine]    • Erythromycin    • Latex    • Penicillins    • Sulfa Antibiotics    • Tdap [Tetanus-Diphth-Acell Pertussis]    • Tetracyclines & Related         Review of Systems   Constitutional: Negative for activity  "change, appetite change, chills, diaphoresis, fatigue, fever and unexpected weight change.   HENT: Negative for congestion, dental problem, drooling, ear discharge, ear pain, facial swelling, hearing loss, mouth sores, nosebleeds, postnasal drip, rhinorrhea, sinus pressure, sneezing, sore throat, tinnitus, trouble swallowing and voice change.    Eyes: Negative for photophobia, pain, discharge, redness, itching and visual disturbance.   Respiratory: Negative for apnea, cough, choking, chest tightness, shortness of breath, wheezing and stridor.    Cardiovascular: Negative for chest pain, palpitations and leg swelling.   Gastrointestinal: Negative for abdominal distention, abdominal pain, anal bleeding, blood in stool, constipation, diarrhea, nausea, rectal pain and vomiting.   Endocrine: Negative for cold intolerance, heat intolerance, polydipsia, polyphagia and polyuria.   Genitourinary: Negative for decreased urine volume, difficulty urinating, dysuria, enuresis, flank pain, frequency, genital sores, hematuria and urgency.   Musculoskeletal: Positive for arthralgias. Negative for back pain, gait problem, joint swelling, myalgias, neck pain and neck stiffness.   Skin: Negative for color change, pallor, rash and wound.   Allergic/Immunologic: Negative for environmental allergies, food allergies and immunocompromised state.   Neurological: Negative for dizziness, tremors, seizures, syncope, facial asymmetry, speech difficulty, weakness, light-headedness, numbness and headaches.   Hematological: Negative for adenopathy. Does not bruise/bleed easily.   Psychiatric/Behavioral: Negative for agitation, behavioral problems, confusion, decreased concentration, dysphoric mood, hallucinations, self-injury, sleep disturbance and suicidal ideas. The patient is not nervous/anxious and is not hyperactive.         Objective      Physical Exam  /50   Ht 172.7 cm (67.99\")   Wt 76.3 kg (168 lb 3.2 oz)   BMI 25.58 kg/m² "     Body mass index is 25.58 kg/m².    General:   Mental Status:  Alert   Appearance: Cooperative, in no acute distress   Build and Nutrition: Well-nourished well-developed male   Orientation: Alert and oriented to person, place and time   Posture: Normal   Gait: Limp on the left    Integument:   Left hip: No skin lesions, no rash, no ecchymosis    Neurologic:   Sensation:    Left foot: Intact to light touch on the dorsal and plantar aspect   Motor:  Left lower extremity: 5/5 quadriceps, hamstrings, ankle dorsiflexors, and ankle plantar flexors    Lower Extremity:   Left Hip:    Tenderness:  None    Swelling:  None    Crepitus:  None    Atrophy:  None    Range of motion:  External Rotation: 20°       Internal Rotation: 20°       Flexion:  90°       Extension:  0°   Instability:  None  Deformities:  None  [facility]    Slightly short on the left compared to the right      Imaging/Studies      Imaging Results (Last 24 Hours)     Procedure Component Value Units Date/Time    XR Hip With or Without Pelvis 2 - 3 View Left [[id number]] Resulted: 01/25/23 1529     Updated: 01/25/23 1530    Narrative:      Left Hip Radiographs  Indication: left hip pain  Views: low AP pelvis and lateral of the left hip    Comparison: no prior studies available for review    Findings:   Advanced arthritis, with sclerosis in the femoral head, flattening,   thickening of the femoral neck, vascular calcifications, no acute bony   abnormalities.  Advanced left hip arthritis, with likely avascular   component.          Assessment and Plan     Diagnoses and all orders for this visit:    1. Primary osteoarthritis of left hip (Primary)  -     XR Hip With or Without Pelvis 2 - 3 View Left        1. Primary osteoarthritis of left hip        I reviewed my findings with the patient.  He has advanced left hip arthritis, with a likely avascular component.  Pain is worsened over the past year, and he is interested in hip  replacement surgery if appropriate.  We will proceed at a mutually convenient time.  He understands that in light of his Parkinson's disease, his rehabilitation and recovery will likely take longer, and the results may be somewhat less than patient's without Parkinson's.  He understands, consents, and his questions were answered.  Please see my counseling note for details.  His wife was also present for the discussion today.  We will likely lean towards using a dual mobility construct.  I also informed him that his hemoglobin A1c will need to be below 8 before proceeding with surgery.  He will also need cardiac clearance preoperatively.    Surgical Counseling     I have informed the patient of the diagnosis and the prognosis.  Exhaustive conservative treatment modalities have not resulted in long term pain relief.  The symptoms have progressed to the point of daily pain and inability to perform activities of daily living without significant pain.  The patient has reached the point of desiring to proceed with total hip arthroplasty after discussing the risks, benefits and alternatives to the procedure.  The surgical procedure itself was discussed in detail.  Risks of the procedure were discussed, which included but are not limited to, bleeding, infection, damage to blood vessels and nerves, incomplete pain relief, loosening of the prosthesis (early or late), deep infection (early or late), need for further surgery, leg length discrepancy, hip dislocation, loss of limb, deep venous thrombosis, pulmonary embolus, death, heart attack, stroke, kidney failure, liver failure, and anesthetic complications.  In addition, the potential for deep infection developing in the future was discussed, which could require further surgery.  The hip would have to be re-opened, debrided, and potentially remove the prosthesis, which may or may not be replaced in the future.  Also, the possibility for loosening of the prosthesis has been  mentioned.  If the prosthesis loosened, a revision arthroplasty could be performed, with results that are not as predictable compared to the original procedure.  The typical rehabilitative course has also been discussed, and full recovery may take up to a year to see the maximum benefit.  The importance of patient cooperation in the rehabilitative efforts has also been discussed.  No guarantees were given.  The patient understands the potential risks versus the benefits and desires to proceed with total hip arthroplasty at a mutually convenient time.    Return for surgery.      Ty Hagan MD  01/25/23  15:45 EST

## 2023-02-12 ENCOUNTER — PREP FOR SURGERY (OUTPATIENT)
Dept: OTHER | Facility: HOSPITAL | Age: 58
End: 2023-02-12
Payer: MEDICARE

## 2023-02-12 DIAGNOSIS — M16.12 PRIMARY OSTEOARTHRITIS OF LEFT HIP: Primary | ICD-10-CM

## 2023-02-12 PROBLEM — M16.9 DEGENERATIVE ARTHRITIS OF HIP: Status: ACTIVE | Noted: 2023-02-12

## 2023-02-12 RX ORDER — ACETAMINOPHEN 500 MG
1000 TABLET ORAL ONCE
Status: CANCELLED | OUTPATIENT
Start: 2023-02-12 | End: 2023-02-12

## 2023-02-12 RX ORDER — PREGABALIN 150 MG/1
150 CAPSULE ORAL ONCE
Status: CANCELLED | OUTPATIENT
Start: 2023-02-12 | End: 2023-02-12

## 2023-02-12 RX ORDER — TRANEXAMIC ACID 10 MG/ML
1000 INJECTION, SOLUTION INTRAVENOUS ONCE
Status: CANCELLED | OUTPATIENT
Start: 2023-02-12 | End: 2023-02-12

## 2023-02-12 RX ORDER — CEFAZOLIN SODIUM 2 G/100ML
2 INJECTION, SOLUTION INTRAVENOUS ONCE
Status: CANCELLED | OUTPATIENT
Start: 2023-02-12 | End: 2023-02-12

## 2023-02-12 RX ORDER — MELOXICAM 15 MG/1
15 TABLET ORAL ONCE
Status: CANCELLED | OUTPATIENT
Start: 2023-02-12 | End: 2023-02-12

## 2023-02-13 ENCOUNTER — HOSPITAL ENCOUNTER (OUTPATIENT)
Facility: HOSPITAL | Age: 58
Setting detail: SURGERY ADMIT
End: 2023-02-13
Attending: ORTHOPAEDIC SURGERY | Admitting: ORTHOPAEDIC SURGERY
Payer: MEDICARE

## 2023-03-06 ENCOUNTER — HOSPITAL ENCOUNTER (OUTPATIENT)
Dept: CARDIOLOGY | Facility: HOSPITAL | Age: 58
Discharge: HOME | End: 2023-03-06
Payer: MEDICARE

## 2023-03-06 ENCOUNTER — OFFICE VISIT (OUTPATIENT)
Dept: CARDIOLOGY | Facility: HOSPITAL | Age: 58
End: 2023-03-06
Payer: MEDICARE

## 2023-03-06 VITALS
WEIGHT: 185.6 LBS | BODY MASS INDEX: 28.13 KG/M2 | RESPIRATION RATE: 18 BRPM | OXYGEN SATURATION: 96 % | TEMPERATURE: 98 F | HEART RATE: 85 BPM | DIASTOLIC BLOOD PRESSURE: 61 MMHG | SYSTOLIC BLOOD PRESSURE: 120 MMHG | HEIGHT: 68 IN

## 2023-03-06 DIAGNOSIS — I25.10 CORONARY ARTERY DISEASE INVOLVING NATIVE CORONARY ARTERY OF NATIVE HEART WITHOUT ANGINA PECTORIS: ICD-10-CM

## 2023-03-06 DIAGNOSIS — I25.10 CORONARY ARTERY DISEASE INVOLVING NATIVE CORONARY ARTERY OF NATIVE HEART WITHOUT ANGINA PECTORIS: Primary | ICD-10-CM

## 2023-03-06 DIAGNOSIS — I10 ESSENTIAL HYPERTENSION: ICD-10-CM

## 2023-03-06 DIAGNOSIS — E78.2 MIXED HYPERLIPIDEMIA: ICD-10-CM

## 2023-03-06 LAB
QT INTERVAL: 376 MS
QTC INTERVAL: 444 MS

## 2023-03-06 PROCEDURE — 93010 ELECTROCARDIOGRAM REPORT: CPT | Performed by: INTERNAL MEDICINE

## 2023-03-06 PROCEDURE — 93005 ELECTROCARDIOGRAM TRACING: CPT | Performed by: NURSE PRACTITIONER

## 2023-03-06 PROCEDURE — 99204 OFFICE O/P NEW MOD 45 MIN: CPT | Performed by: NURSE PRACTITIONER

## 2023-03-06 RX ORDER — ROSUVASTATIN CALCIUM 20 MG/1
20 TABLET, COATED ORAL DAILY
COMMUNITY

## 2023-03-06 RX ORDER — GABAPENTIN 400 MG/1
400 CAPSULE ORAL 3 TIMES DAILY
COMMUNITY

## 2023-03-07 ENCOUNTER — DOCUMENTATION (OUTPATIENT)
Dept: CARDIOLOGY | Facility: HOSPITAL | Age: 58
End: 2023-03-07
Payer: MEDICARE

## 2023-03-07 NOTE — PROGRESS NOTES
"Chief Complaint  Left Hip Cardiac Clearance    Subjective    History of Present Illness {CC  Problem List  Visit  Diagnosis   Encounters  Notes  Medications  Labs  Result Review Imaging  Media :23}       History of Present Illness   57-year-old male presents the office today at the request of Dr. Hagan for cardiac clearance.  Patient underwent a CABG x5 in 2006 at [facility].  Patient has had stents to his LAD post CABG as well as  a stent to the vein graft in the RCA.  Most recently saw the [place] but reports that Dr. Yap has retired.  Patient is feeling well.  He reports that he has lost 99 pounds in the last year intentionally.  He currently denies chest pain, dyspnea, palpitations, presyncope, syncope, orthopnea, PND or pedal edema.  Blood pressures well controlled at home 1 teens to 120s systolic.  Patient's only complaint today is his hip pain on the left side.  History of hyperlipidemia, CAD, hypertension, vitamin D deficiency, vitamin B12 deficiency, GERD, type 2 diabetes mellitus, anemia, chronic low back pain, osteoarthritis, restless leg syndrome  Vital Signs:   Vitals:    03/06/23 1213 03/06/23 1214 03/06/23 1215   BP: 125/73 115/68 120/61   BP Location: Right arm Left arm Left arm   Patient Position: Sitting Standing Sitting   Cuff Size: Adult Adult Adult   Pulse: 84 86 85   Resp:   18   Temp: 98 °F (36.7 °C) 98 °F (36.7 °C) 98 °F (36.7 °C)   TempSrc: Temporal Temporal Temporal   SpO2: 98% 98% 96%   Weight:   84.2 kg (185 lb 9.6 oz)   Height:   172.7 cm (68\")     Body mass index is 28.22 kg/m².  Physical Exam  Vitals and nursing note reviewed.   Constitutional:       Appearance: Normal appearance.   HENT:      Head: Normocephalic.   Eyes:      Pupils: Pupils are equal, round, and reactive to light.   Cardiovascular:      Rate and Rhythm: Normal rate and regular rhythm.      Pulses: Normal pulses.      Heart sounds: Normal heart sounds. No murmur heard.  Pulmonary:      Effort: " Pulmonary effort is normal.      Breath sounds: Normal breath sounds.   Abdominal:      General: Bowel sounds are normal.      Palpations: Abdomen is soft.   Musculoskeletal:         General: Normal range of motion.      Cervical back: Normal range of motion.      Right lower leg: No edema.      Left lower leg: No edema.   Skin:     General: Skin is warm and dry.      Capillary Refill: Capillary refill takes less than 2 seconds.   Neurological:      Mental Status: He is alert and oriented to person, place, and time.   Psychiatric:         Mood and Affect: Mood normal.         Thought Content: Thought content normal.              Result Review  Data Reviewed:{ Labs  Result Review  Imaging  Med Tab  Media :23}   EKG today shows normal sinus rhythm with sinus arrhythmia, possible anterior infarct at 84 bpm         Assessment and Plan {CC Problem List  Visit Diagnosis  ROS  Review (Popup)  Health Maintenance  Quality  BestPractice  Medications  SmartSets  SnapShot Encounters  Media :23}   1. Coronary artery disease involving native coronary artery of native heart without angina pectoris  Currently without angina  Continue aspirin, Plavix, Crestor, Ranexa  - ECG 12 Lead; Future  - Ambulatory Referral to Cardiology    2. Mixed hyperlipidemia  Stable on Crestor  - Ambulatory Referral to Cardiology    3. Essential hypertension  Currently without antihypertensives  Continue to monitor  - Ambulatory Referral to Cardiology      Surgical Cardiac Risk Assessment    Patient Name: Markos Salinas : 1965 MRN: [id number]    Procedure: left total hip Arthroplasty  Scheduled Date: 23  Scheduled Surgeon: Dr Hagan   Revised Cardiac Risk Index (RCRI)     Clinical Risk Factors  Check if Present or Past    History    High-risk type of surgery (examples include vascular surgery and any open intraperitoneal or intrathoracic procedure) []   +1 point     History of ischemic heart disease (history of myocardial  infarction or a positive exercise test, current complaint of chest pain considered to be secondary to myocardial ischemia, use of nitrate therapy, or ECG with pathological Q waves; do not count prior coronary revascularization procedures unless one of the other criteria for ischemic heart disease is present) [x]   +1 point     History of heart failure []   +1 point     History of cerebrovascular disease []   +1 point     Diabetes mellitus requiring treatment with insulin   []   +1 point   Preoperative serum creatinine >2.0 mg/dL   (177 micromol/L)   []   +1 point       Rate of cardiac death, nonfatal myocardial infarction, and nonfatal cardiac arrest according to the number of predictors   Total Points       [] 0= 0.4% (95% CI 0.1-0.8)       [x] 1= 1.0% (95% CI 0.5-1.4)       [] 2= 2.4% (95% CI 1.3-3.5)       [] 3 or more= 5.4% (95% CI 2.8-7.9)         Rate of myocardial infarction, pulmonary edema, ventricular fibrillation, primary cardiac arrest, and complete heart block   Total Points       [] 0= 0.5% (95% CI 0.2-1.1)       [x] 1= 1.3% (95% CI 0.7-2.1)       [] 2= 3.6% (95% CI 2.1-5.6)       [] 3 or more= 9.1% (95% CI 5.5-13.8)     Recommendations    [x] Careful hemodynamic control (I.e, HR 60-70 bpm, no hypotension)  [x] Continue pre-op statin if no contraindications      Please hold your Plavix for 5 days prior to surgery and resume 1 days after surgery.   Please hold asa one day before surgery and resume the morning after surgery  Michelle Solano, APRN  03/06/2023    Follow Up {Instructions Charge Capture  Follow-up Communications :23}   Return if symptoms worsen or fail to improve.    Patient was given instructions and counseling regarding his condition or for health maintenance advice. Please see specific information pulled into the AVS if appropriate.  Patient was instructed to call the [facility] with any questions, concerns, or worsening symptoms.

## 2023-03-07 NOTE — PROGRESS NOTES
Tried to reach pt to discuss holding plavix for 5 days and asa for one day prior to surgery. No answer and no voicemail.

## 2023-03-23 ENCOUNTER — PRE-ADMISSION TESTING (OUTPATIENT)
Dept: PREADMISSION TESTING | Facility: HOSPITAL | Age: 58
End: 2023-03-23
Payer: MEDICARE

## 2023-03-23 VITALS — WEIGHT: 184.08 LBS | HEIGHT: 68 IN | BODY MASS INDEX: 27.9 KG/M2

## 2023-03-23 DIAGNOSIS — M16.12 PRIMARY OSTEOARTHRITIS OF LEFT HIP: ICD-10-CM

## 2023-03-23 LAB
ANION GAP SERPL CALCULATED.3IONS-SCNC: 9 MMOL/L (ref 5–15)
APTT PPP: 30.5 SECONDS (ref 22–39)
BASOPHILS # BLD AUTO: 0.05 10*3/MM3 (ref 0–0.2)
BASOPHILS NFR BLD AUTO: 0.3 % (ref 0–1.5)
BUN SERPL-MCNC: 21 MG/DL (ref 6–20)
BUN/CREAT SERPL: 14.9 (ref 7–25)
CALCIUM SPEC-SCNC: 9.3 MG/DL (ref 8.6–10.5)
CHLORIDE SERPL-SCNC: 102 MMOL/L (ref 98–107)
CO2 SERPL-SCNC: 28 MMOL/L (ref 22–29)
CREAT SERPL-MCNC: 1.41 MG/DL (ref 0.76–1.27)
CRP SERPL-MCNC: 1.48 MG/DL (ref 0–0.5)
DEPRECATED RDW RBC AUTO: 47.2 FL (ref 37–54)
EGFRCR SERPLBLD CKD-EPI 2021: 57.8 ML/MIN/1.73
EOSINOPHIL # BLD AUTO: 0.15 10*3/MM3 (ref 0–0.4)
EOSINOPHIL NFR BLD AUTO: 1 % (ref 0.3–6.2)
ERYTHROCYTE [DISTWIDTH] IN BLOOD BY AUTOMATED COUNT: 14.7 % (ref 12.3–15.4)
ERYTHROCYTE [SEDIMENTATION RATE] IN BLOOD: 30 MM/HR (ref 0–20)
GLUCOSE SERPL-MCNC: 139 MG/DL (ref 65–99)
HBA1C MFR BLD: 8.1 % (ref 4.8–5.6)
HCT VFR BLD AUTO: 35.5 % (ref 37.5–51)
HGB BLD-MCNC: 11.2 G/DL (ref 13–17.7)
IMM GRANULOCYTES # BLD AUTO: 0.06 10*3/MM3 (ref 0–0.05)
IMM GRANULOCYTES NFR BLD AUTO: 0.4 % (ref 0–0.5)
INR PPP: 1.06 (ref 0.84–1.13)
LYMPHOCYTES # BLD AUTO: 1.71 10*3/MM3 (ref 0.7–3.1)
LYMPHOCYTES NFR BLD AUTO: 11.9 % (ref 19.6–45.3)
MCH RBC QN AUTO: 27.9 PG (ref 26.6–33)
MCHC RBC AUTO-ENTMCNC: 31.5 G/DL (ref 31.5–35.7)
MCV RBC AUTO: 88.3 FL (ref 79–97)
MONOCYTES # BLD AUTO: 0.57 10*3/MM3 (ref 0.1–0.9)
MONOCYTES NFR BLD AUTO: 4 % (ref 5–12)
NEUTROPHILS NFR BLD AUTO: 11.78 10*3/MM3 (ref 1.7–7)
NEUTROPHILS NFR BLD AUTO: 82.4 % (ref 42.7–76)
NRBC BLD AUTO-RTO: 0 /100 WBC (ref 0–0.2)
PLATELET # BLD AUTO: 358 10*3/MM3 (ref 140–450)
PMV BLD AUTO: 9.6 FL (ref 6–12)
POTASSIUM SERPL-SCNC: 5.4 MMOL/L (ref 3.5–5.2)
PROTHROMBIN TIME: 13.7 SECONDS (ref 11.4–14.4)
RBC # BLD AUTO: 4.02 10*6/MM3 (ref 4.14–5.8)
SODIUM SERPL-SCNC: 139 MMOL/L (ref 136–145)
WBC NRBC COR # BLD: 14.32 10*3/MM3 (ref 3.4–10.8)

## 2023-03-23 PROCEDURE — 85610 PROTHROMBIN TIME: CPT

## 2023-03-23 PROCEDURE — 36415 COLL VENOUS BLD VENIPUNCTURE: CPT

## 2023-03-23 PROCEDURE — 86140 C-REACTIVE PROTEIN: CPT

## 2023-03-23 PROCEDURE — 85730 THROMBOPLASTIN TIME PARTIAL: CPT

## 2023-03-23 PROCEDURE — 80048 BASIC METABOLIC PNL TOTAL CA: CPT

## 2023-03-23 PROCEDURE — 83036 HEMOGLOBIN GLYCOSYLATED A1C: CPT

## 2023-03-23 PROCEDURE — 85652 RBC SED RATE AUTOMATED: CPT

## 2023-03-23 PROCEDURE — 85025 COMPLETE CBC W/AUTO DIFF WBC: CPT

## 2023-03-23 RX ORDER — METHYLPREDNISOLONE 4 MG/1
4 TABLET ORAL DAILY
COMMUNITY

## 2023-03-23 NOTE — PAT
An arrival time for procedure was not provided during PAT visit. If patient had any questions or concerns about their arrival time, they were instructed to contact their surgeon/physician.  Additionally, if the patient referred to an arrival time that was acquired from their my chart account, patient was encouraged to verify that time with their surgeon/physician. Arrival times are NOT provided in Pre Admission Testing Department.    Patient instructed to drink 20 ounces of Gatorade and it needs to be completed 1 hour (for Main OR patients) or 2 hours (scheduled  section & [facility] patients) before given arrival time for procedure (NO RED Gatorade)    Patient verbalized understanding.    Patient to apply Chlorhexadine wipes  to surgical area (as instructed) the night before procedure and the AM of procedure. Wipes provided.    Prescription for Chlorhexidine shower called into patient's pharmacy or [facility] pharmacy by patient's surgeon.  Reinforced with patient to  the prescription from applicable pharmacy if they haven't already.  Verbal and written instructions given regarding proper use of Chlorhexidine body wash to patient and/or famlily during PAT visit. Patient/family also instructed to complete checklist and return it to Pre-op on the day of surgery.  Patient and/or family verbalized understanding.    Patient denies any current skin issues.     Patient viewed general PAT education video as instructed in their preoperative information received from their surgeon.  Patient stated the general PAT education video was viewed in its entirety and survey completed.  Copies of PAT general education handouts (Incentive Spirometry, Meds to Beds Program, Patient Belongings, Pre-op skin preparation instructions, Blood Glucose testing, Visitor policy, Surgery FAQ, Code H) distributed to patient if not printed. Education related to the PAT pass and skin preparation for surgery (if applicable) completed in PAT as  a reinforcement to PAT education video. Patient instructed to return PAT pass provided today as well as completed skin preparation sheet (if applicable) on the day of procedure.     Additionally if patient had not viewed video yet but intended to view it at home or in our waiting area, then referred them to the handout with QR code/link provided during PAT visit.  Instructed patient to complete survey after viewing the video in its entirety.  Encouraged patient/family to read PAT general education handouts thoroughly and notify PAT staff with any questions or concerns. Patient verbalized understanding of all information and priority content.    Discussed with patient options for receiving total joint replacement education and assessed patient's ability and preference. Joint Replacement Guide given to patient during PAT visit since not received a copy within the last year. Encouraged patient/family to read guide thoroughly and notify PAT staff with any questions or concerns. Handout provided directing patient to links to watch online videos related to joint replacement surgery on the [facility] website. The handout gives detailed instructions for joining an online joint replacement class through Zoom or phone conference offered on Thursdays. Patient agreed to participate by watching videos online. Patient verbalized understanding of instructions and to complete the online learning tool survey. Encouraged to share information with family and/or . An overview of the joint replacement education was provided during the visit including general perioperative instructions that are routine for all surgical patients (PAT PASS, wipes, directions to pre-op, etc.).    Verified patient previously completed cardiology visit for cardiac risk assessment in preparation for upcoming procedure, completion of 12-lead ECG within six months, and risk assessment letter reviewed. No further interventions required.   BRENDON SPEARS,  APRN FROM 3/6/23 WITH EKG. PT DENIES CP/SOA.     PT REPORTED PCN ALLERGY. LM WITH BRETT RIBERA. ALSO, LM WITH BRETT REGARDING A1C 8.1.

## 2023-06-08 ENCOUNTER — TELEPHONE (OUTPATIENT)
Dept: ORTHOPEDIC SURGERY | Facility: CLINIC | Age: 58
End: 2023-06-08
Payer: MEDICARE

## 2023-06-08 ENCOUNTER — PRE-ADMISSION TESTING (OUTPATIENT)
Dept: PREADMISSION TESTING | Facility: HOSPITAL | Age: 58
End: 2023-06-08
Payer: MEDICARE

## 2023-06-08 VITALS — HEIGHT: 69 IN | WEIGHT: 177.36 LBS | BODY MASS INDEX: 26.27 KG/M2

## 2023-06-08 LAB
DEPRECATED RDW RBC AUTO: 53.2 FL (ref 37–54)
ERYTHROCYTE [DISTWIDTH] IN BLOOD BY AUTOMATED COUNT: 16.1 % (ref 12.3–15.4)
HBA1C MFR BLD: 7.1 % (ref 4.8–5.6)
HCT VFR BLD AUTO: 36.3 % (ref 37.5–51)
HGB BLD-MCNC: 10.9 G/DL (ref 13–17.7)
INR PPP: 1.07 (ref 0.89–1.12)
MCH RBC QN AUTO: 26.8 PG (ref 26.6–33)
MCHC RBC AUTO-ENTMCNC: 30 G/DL (ref 31.5–35.7)
MCV RBC AUTO: 89.4 FL (ref 79–97)
PLATELET # BLD AUTO: 278 10*3/MM3 (ref 140–450)
PMV BLD AUTO: 10.2 FL (ref 6–12)
POTASSIUM SERPL-SCNC: 6.2 MMOL/L (ref 3.5–5.2)
POTASSIUM SERPL-SCNC: 6.5 MMOL/L (ref 3.5–5.2)
PROTHROMBIN TIME: 14 SECONDS (ref 12.2–14.5)
RBC # BLD AUTO: 4.06 10*6/MM3 (ref 4.14–5.8)
WBC NRBC COR # BLD: 6.93 10*3/MM3 (ref 3.4–10.8)

## 2023-06-08 PROCEDURE — 36415 COLL VENOUS BLD VENIPUNCTURE: CPT

## 2023-06-08 PROCEDURE — 84132 ASSAY OF SERUM POTASSIUM: CPT | Performed by: ORTHOPAEDIC SURGERY

## 2023-06-08 PROCEDURE — 84132 ASSAY OF SERUM POTASSIUM: CPT

## 2023-06-08 PROCEDURE — 85610 PROTHROMBIN TIME: CPT

## 2023-06-08 PROCEDURE — 85027 COMPLETE CBC AUTOMATED: CPT

## 2023-06-08 PROCEDURE — 83036 HEMOGLOBIN GLYCOSYLATED A1C: CPT

## 2023-06-08 RX ORDER — FLUTICASONE PROPIONATE 50 MCG
1 SPRAY, SUSPENSION (ML) NASAL DAILY
COMMUNITY
Start: 2023-05-23

## 2023-06-08 RX ORDER — DULOXETIN HYDROCHLORIDE 30 MG/1
30 CAPSULE, DELAYED RELEASE ORAL DAILY
COMMUNITY
Start: 2023-04-20

## 2023-06-08 RX ORDER — INSULIN DEGLUDEC 200 U/ML
50 INJECTION, SOLUTION SUBCUTANEOUS DAILY
COMMUNITY
Start: 2023-05-08

## 2023-06-08 RX ORDER — METOPROLOL TARTRATE 50 MG/1
50 TABLET, FILM COATED ORAL
COMMUNITY

## 2023-06-08 NOTE — PAT
Spoke with Dr Devan dodson ( [facility]). They have already been notified of critical K+ level of 6.2. They have notified Dr Hagan .

## 2023-06-08 NOTE — PAT
POTASSIUM RAN AGAIN AND LAB CONFIRMED K+6.5. REPORTED TO MELECIO IN DR. CALDERA'S OFFICE. MELECIO HAS REPORTED TO DR. CORRALES AND WILL REPORT TO DR. CALDERA.

## 2023-06-08 NOTE — PAT
An arrival time for procedure was not provided during PAT visit. If patient had any questions or concerns about their arrival time, they were instructed to contact their surgeon/physician.  Additionally, if the patient referred to an arrival time that was acquired from their my chart account, patient was encouraged to verify that time with their surgeon/physician. Arrival times are NOT provided in Pre Admission Testing Department.    Patient viewed general PAT education video as instructed in their preoperative information received from their surgeon.  Patient stated the general PAT education video was viewed in its entirety and survey completed.  Copies of PAT general education handouts (Incentive Spirometry, Meds to Beds Program, Patient Belongings, Pre-op skin preparation instructions, Blood Glucose testing, Visitor policy, Surgery FAQ, Code H) distributed to patient if not printed. Education related to the PAT pass and skin preparation for surgery (if applicable) completed in PAT as a reinforcement to PAT education video. Patient instructed to return PAT pass provided today as well as completed skin preparation sheet (if applicable) on the day of procedure.     Additionally if patient had not viewed video yet but intended to view it at home or in our waiting area, then referred them to the handout with QR code/link provided during PAT visit.  Instructed patient to complete survey after viewing the video in its entirety.  Encouraged patient/family to read PAT general education handouts thoroughly and notify PAT staff with any questions or concerns. Patient verbalized understanding of all information and priority content.    Patient denies any current skin issues.     Patient to apply Chlorhexadine wipes  to surgical area (as instructed) the night before procedure and the AM of procedure. Wipes provided.    Prescription for Chlorhexidine shower called into patient's pharmacy or [facility] pharmacy by patient's surgeon.   Reinforced with patient to  the prescription from applicable pharmacy if they haven't already.  Verbal and written instructions given regarding proper use of Chlorhexidine body wash to patient and/or famlily during PAT visit. Patient/family also instructed to complete checklist and return it to Pre-op on the day of surgery.  Patient and/or family verbalized understanding.    Patient instructed to drink 20 ounces of Gatorade and it needs to be completed 1 hour (for Main OR patients) or 2 hours (scheduled  section & [facility] patients) before given arrival time for procedure (NO RED Gatorade)    Patient verbalized understanding.    Post-Surgery Information Instruction Sheet given to patient during Pre-Admission Testing Visit with verbal instructions to patient to return with PAT PASS on the day of surgery. Additionally, encouraged patient to review the information provided.    Discussed with patient options for receiving total joint replacement education and assessed patient's ability and preference. Joint Replacement Guide given to patient during PAT visit since not received a copy within the last year. Encouraged patient/family to read guide thoroughly and notify PAT staff with any questions or concerns. Handout provided directing patient to links to watch online videos related to joint replacement surgery on the [facility] website. The handout gives detailed instructions for joining an online joint replacement class through Zoom or phone conference offered on . Patient agreed to participate by watching videos online. Patient verbalized understanding of instructions and to complete the online learning tool survey. Encouraged to share information with family and/or . An overview of the joint replacement education was provided during the visit including general perioperative instructions that are routine for all surgical patients (PAT PASS, wipes, directions to pre-op, etc.). PT AND  WIFE TAUGHT HOW TO USE QR CODE TO ACCESS VIDEOS ONLINE. PT AND WIFE VERBALIZED UNDERSTANDING.

## 2023-06-08 NOTE — TELEPHONE ENCOUNTER
Spoke to pt to let him know Dr. Hagan's message; He verbalized understanding.    Pt was instructed pt to give me a call back if there are any issues with getting him in ASAP. Will follow up with him tomorrow on this.    Marina HUSAIN CMA ([facility]), ROT

## 2023-06-09 NOTE — TELEPHONE ENCOUNTER
Dr. Hagan-just an fyi. Thanks!    Called pt to find out if he had spoke to his PCP regarding his elevated potassium level. He reports that he spoke to them and they have called him something in for constipation as he states he has had elevated potassium levels in the past due to this problem. He states that they are going to see him next Wed.  6/14/23 for a re-evaluation. I told him to let us know after Wednesday how things go. He understood.    Marina HUSAIN CMA ([facility]), ROT

## 2023-07-21 ENCOUNTER — PRE-ADMISSION TESTING (OUTPATIENT)
Dept: PREADMISSION TESTING | Facility: HOSPITAL | Age: 58
End: 2023-07-21
Payer: MEDICARE

## 2023-07-21 VITALS — BODY MASS INDEX: 27.15 KG/M2 | WEIGHT: 179.12 LBS | HEIGHT: 68 IN

## 2023-07-21 LAB
DEPRECATED RDW RBC AUTO: 48.7 FL (ref 37–54)
ERYTHROCYTE [DISTWIDTH] IN BLOOD BY AUTOMATED COUNT: 15.5 % (ref 12.3–15.4)
HBA1C MFR BLD: 6.9 % (ref 4.8–5.6)
HCT VFR BLD AUTO: 32.6 % (ref 37.5–51)
HGB BLD-MCNC: 10.4 G/DL (ref 13–17.7)
INR PPP: 1.01 (ref 0.89–1.12)
MCH RBC QN AUTO: 27.4 PG (ref 26.6–33)
MCHC RBC AUTO-ENTMCNC: 31.9 G/DL (ref 31.5–35.7)
MCV RBC AUTO: 86 FL (ref 79–97)
PLATELET # BLD AUTO: 235 10*3/MM3 (ref 140–450)
PMV BLD AUTO: 9.9 FL (ref 6–12)
POTASSIUM SERPL-SCNC: 5 MMOL/L (ref 3.5–5.2)
PROTHROMBIN TIME: 13.4 SECONDS (ref 12.2–14.5)
RBC # BLD AUTO: 3.79 10*6/MM3 (ref 4.14–5.8)
WBC NRBC COR # BLD: 6.88 10*3/MM3 (ref 3.4–10.8)

## 2023-07-21 PROCEDURE — 84132 ASSAY OF SERUM POTASSIUM: CPT

## 2023-07-21 PROCEDURE — 83036 HEMOGLOBIN GLYCOSYLATED A1C: CPT

## 2023-07-21 PROCEDURE — 85027 COMPLETE CBC AUTOMATED: CPT

## 2023-07-21 PROCEDURE — 85610 PROTHROMBIN TIME: CPT

## 2023-07-21 PROCEDURE — 36415 COLL VENOUS BLD VENIPUNCTURE: CPT

## 2023-07-21 NOTE — PAT
Patient has been rescheduled twice.  The first time in march 2023 was due to elevated ha1c and then June 2023 it was cancelled due to elevated potassium.  Patient was struggling with severe constipation at that time and his PCP thought his elevated potassium was due to the constipation.  Constipation has resolved per patient and potassium has been rechecked and was within normal limits.     Clean catch urinalysis not indicated because patient denied recent urinary frequency, urinary urgency, burning/pain upon urination, or flank pain. No recent UTIs.    Patient watched videos June 2023 before surgery was cancelled.    An arrival time for procedure was not provided during PAT visit. If patient had any questions or concerns about their arrival time, they were instructed to contact their surgeon/physician.  Additionally, if the patient referred to an arrival time that was acquired from their my chart account, patient was encouraged to verify that time with their surgeon/physician. Arrival times are NOT provided in Pre Admission Testing Department.    Patient still has CHG wipes and wash from his last PAT visit.  Patient to use as instructed during last PAT visit;    Patient to hold Plavix and aspirin 5 days before surgery.

## 2023-08-02 DIAGNOSIS — D64.9 ANEMIA, UNSPECIFIED TYPE: Primary | ICD-10-CM
